# Patient Record
Sex: FEMALE | Race: WHITE | Employment: FULL TIME | URBAN - METROPOLITAN AREA
[De-identification: names, ages, dates, MRNs, and addresses within clinical notes are randomized per-mention and may not be internally consistent; named-entity substitution may affect disease eponyms.]

---

## 2017-09-02 ENCOUNTER — OFFICE VISIT (OUTPATIENT)
Dept: FAMILY MEDICINE CLINIC | Facility: CLINIC | Age: 57
End: 2017-09-02

## 2017-09-02 VITALS
WEIGHT: 239 LBS | SYSTOLIC BLOOD PRESSURE: 122 MMHG | HEART RATE: 78 BPM | TEMPERATURE: 98 F | DIASTOLIC BLOOD PRESSURE: 78 MMHG | RESPIRATION RATE: 18 BRPM

## 2017-09-02 DIAGNOSIS — L03.112 CELLULITIS OF LEFT AXILLA: Primary | ICD-10-CM

## 2017-09-02 PROCEDURE — 99203 OFFICE O/P NEW LOW 30 MIN: CPT | Performed by: NURSE PRACTITIONER

## 2017-09-02 RX ORDER — MUPIROCIN CALCIUM 20 MG/G
CREAM TOPICAL
Qty: 15 G | Refills: 3 | Status: SHIPPED | OUTPATIENT
Start: 2017-09-02 | End: 2018-12-14

## 2017-09-02 RX ORDER — SULFAMETHOXAZOLE AND TRIMETHOPRIM 800; 160 MG/1; MG/1
1 TABLET ORAL 2 TIMES DAILY
Qty: 14 TABLET | Refills: 0 | Status: SHIPPED | OUTPATIENT
Start: 2017-09-02 | End: 2017-09-09

## 2017-09-02 NOTE — PROGRESS NOTES
CHIEF COMPLAINT:   Patient presents with:  Rash: rash under arm first noticed on Monday 08.28.2017      HPI:     Naveen Major is a 49year old female who presents with concerns of rash on left arm pit and left upper arm.  Patient first noticed symptom SKIN: Lesion(s):#1 is located on left axilla, has 4 x 4 cm area of erythematous papular lesions.  ; #2 is located on left upper extremity below axilla is 6.5 x 14   + erythema, denies tenderness, + increased warmth, no fluctuance, no  drainage, + sharply de Risks, benefits, and side effects of medication explained and discussed. Patient Instructions     Cellulitis  Cellulitis is an infection of the deep layers of skin. A break in the skin, such as a cut or scratch, can let bacteria under the skin.  If the b · Fluid leaking from the skin (pus)  · Fever higher of 100.4º F (38.0º C) or higher after 2 days on antibiotics  Date Last Reviewed: 9/1/2016  © 5472-7045 [employer] [address] [place] [address], [address] [place] [address]. All rights reserved.  This infor

## 2017-09-02 NOTE — PATIENT INSTRUCTIONS
Cellulitis  Cellulitis is an infection of the deep layers of skin. A break in the skin, such as a cut or scratch, can let bacteria under the skin. If the bacteria get to deep layers of the skin, it can be serious.  If not treated, cellulitis can get into · Fever higher of 100.4º F (38.0º C) or higher after 2 days on antibiotics  Date Last Reviewed: 9/1/2016  © 8885-3377 [address], [address] [place] [address]. All rights reserved.  This information is not intended as a substitut

## 2017-09-26 ENCOUNTER — LAB SERVICES (OUTPATIENT)
Dept: OTHER | Age: 57
End: 2017-09-26

## 2017-09-26 ENCOUNTER — CHARTING TRANS (OUTPATIENT)
Dept: OTHER | Age: 57
End: 2017-09-26

## 2017-09-26 ASSESSMENT — PAIN SCALES - GENERAL: PAINLEVEL_OUTOF10: 0

## 2017-09-27 ENCOUNTER — CHARTING TRANS (OUTPATIENT)
Dept: OTHER | Age: 57
End: 2017-09-27

## 2017-09-27 LAB
25(OH)D3+25(OH)D2 SERPL-MCNC: 25.7 NG/ML (ref 30–100)
ALBUMIN SERPL-MCNC: 4 G/DL (ref 3.6–5.1)
ALBUMIN/GLOB SERPL: 1.2 (ref 1–2.4)
ALP SERPL-CCNC: 65 UNITS/L (ref 45–117)
ALT SERPL-CCNC: 30 UNITS/L
ANION GAP SERPL CALC-SCNC: 14 MMOL/L (ref 10–20)
AST SERPL-CCNC: 16 UNITS/L
BASOPHILS # BLD: 0 K/MCL (ref 0–0.3)
BASOPHILS NFR BLD: 0 %
BILIRUB SERPL-MCNC: 0.6 MG/DL (ref 0.2–1)
BUN SERPL-MCNC: 18 MG/DL (ref 6–20)
BUN/CREAT SERPL: 22 (ref 7–25)
CALCIUM SERPL-MCNC: 8.8 MG/DL (ref 8.4–10.2)
CHLORIDE SERPL-SCNC: 105 MMOL/L (ref 98–107)
CHOLEST SERPL-MCNC: 211 MG/DL
CHOLEST/HDLC SERPL: 4.5
CO2 SERPL-SCNC: 27 MMOL/L (ref 21–32)
CREAT SERPL-MCNC: 0.83 MG/DL (ref 0.51–0.95)
DIFFERENTIAL METHOD BLD: NORMAL
EOSINOPHIL # BLD: 0.2 K/MCL (ref 0.1–0.5)
EOSINOPHIL NFR BLD: 3 %
ERYTHROCYTE [DISTWIDTH] IN BLOOD: 12.9 % (ref 11–15)
GLOBULIN SER-MCNC: 3.2 G/DL (ref 2–4)
GLUCOSE SERPL-MCNC: 81 MG/DL (ref 65–99)
HBA1C MFR BLD: 5.3 % (ref 4.5–5.6)
HDLC SERPL-MCNC: 47 MG/DL
HEMATOCRIT: 39.5 % (ref 36–46.5)
HEMOGLOBIN: 12.7 G/DL (ref 12–15.5)
LDLC SERPL CALC-MCNC: 142 MG/DL
LENGTH OF FAST TIME PATIENT: ABNORMAL HRS
LENGTH OF FAST TIME PATIENT: ABNORMAL HRS
LYMPHOCYTES # BLD: 1.3 K/MCL (ref 1–4)
LYMPHOCYTES NFR BLD: 23 %
MEAN CORPUSCULAR HEMOGLOBIN: 31.5 PG (ref 26–34)
MEAN CORPUSCULAR HGB CONC: 32.2 G/DL (ref 32–36.5)
MEAN CORPUSCULAR VOLUME: 98 FL (ref 78–100)
MONOCYTES # BLD: 0.4 K/MCL (ref 0.3–0.9)
MONOCYTES NFR BLD: 7 %
NEUTROPHILS # BLD: 3.8 K/MCL (ref 1.8–7.7)
NEUTROPHILS NFR BLD: 67 %
NONHDLC SERPL-MCNC: 164 MG/DL
PLATELET COUNT: 193 K/MCL (ref 140–450)
POTASSIUM SERPL-SCNC: 4.1 MMOL/L (ref 3.4–5.1)
RED CELL COUNT: 4.03 MIL/MCL (ref 4–5.2)
SODIUM SERPL-SCNC: 142 MMOL/L (ref 135–145)
TOTAL PROTEIN: 7.2 G/DL (ref 6.4–8.2)
TRIGL SERPL-MCNC: 110 MG/DL
TSH SERPL-ACNC: 1.62 MCUNITS/ML (ref 0.35–5)
WHITE BLOOD COUNT: 5.6 K/MCL (ref 4.2–11)

## 2017-10-06 ENCOUNTER — CHARTING TRANS (OUTPATIENT)
Dept: OTHER | Age: 57
End: 2017-10-06

## 2017-10-06 ENCOUNTER — HOSPITAL (OUTPATIENT)
Dept: OTHER | Age: 57
End: 2017-10-06
Attending: FAMILY MEDICINE

## 2017-10-06 ENCOUNTER — IMAGING SERVICES (OUTPATIENT)
Dept: OTHER | Age: 57
End: 2017-10-06

## 2018-01-15 ENCOUNTER — CHARTING TRANS (OUTPATIENT)
Dept: OTHER | Age: 58
End: 2018-01-15

## 2018-01-15 ENCOUNTER — LAB SERVICES (OUTPATIENT)
Dept: OTHER | Age: 58
End: 2018-01-15

## 2018-01-22 ENCOUNTER — CHARTING TRANS (OUTPATIENT)
Dept: OTHER | Age: 58
End: 2018-01-22

## 2018-01-22 LAB — PAP WITH HIGH RISK HPV: NORMAL

## 2018-04-27 ENCOUNTER — CHARTING TRANS (OUTPATIENT)
Dept: OTHER | Age: 58
End: 2018-04-27

## 2018-05-01 ENCOUNTER — CHARTING TRANS (OUTPATIENT)
Dept: OTHER | Age: 58
End: 2018-05-01

## 2018-05-03 ENCOUNTER — HOSPITAL (OUTPATIENT)
Dept: OTHER | Age: 58
End: 2018-05-03
Attending: PHYSICIAN ASSISTANT

## 2018-05-03 ENCOUNTER — IMAGING SERVICES (OUTPATIENT)
Dept: OTHER | Age: 58
End: 2018-05-03

## 2018-05-04 ENCOUNTER — CHARTING TRANS (OUTPATIENT)
Dept: OTHER | Age: 58
End: 2018-05-04

## 2018-11-01 VITALS
OXYGEN SATURATION: 97 % | BODY MASS INDEX: 43.82 KG/M2 | SYSTOLIC BLOOD PRESSURE: 110 MMHG | HEIGHT: 62 IN | HEART RATE: 83 BPM | WEIGHT: 238.1 LBS | TEMPERATURE: 98.4 F | RESPIRATION RATE: 16 BRPM | DIASTOLIC BLOOD PRESSURE: 84 MMHG

## 2018-11-01 VITALS
WEIGHT: 235.89 LBS | HEART RATE: 81 BPM | BODY MASS INDEX: 43.41 KG/M2 | OXYGEN SATURATION: 96 % | HEIGHT: 62 IN | TEMPERATURE: 99.2 F

## 2018-11-02 VITALS
DIASTOLIC BLOOD PRESSURE: 78 MMHG | BODY MASS INDEX: 44.95 KG/M2 | WEIGHT: 244.25 LBS | HEIGHT: 62 IN | HEART RATE: 80 BPM | SYSTOLIC BLOOD PRESSURE: 122 MMHG

## 2018-11-02 VITALS
DIASTOLIC BLOOD PRESSURE: 80 MMHG | SYSTOLIC BLOOD PRESSURE: 126 MMHG | TEMPERATURE: 99 F | BODY MASS INDEX: 42.06 KG/M2 | WEIGHT: 237.38 LBS | HEIGHT: 63 IN | RESPIRATION RATE: 16 BRPM | OXYGEN SATURATION: 95 % | HEART RATE: 78 BPM

## 2019-03-02 ENCOUNTER — TELEPHONE (OUTPATIENT)
Dept: SCHEDULING | Age: 59
End: 2019-03-02

## 2019-03-06 ENCOUNTER — TELEPHONE (OUTPATIENT)
Dept: SCHEDULING | Age: 59
End: 2019-03-06

## 2020-01-01 ENCOUNTER — EXTERNAL RECORD (OUTPATIENT)
Dept: HEALTH INFORMATION MANAGEMENT | Facility: OTHER | Age: 60
End: 2020-01-01

## 2020-06-29 ENCOUNTER — TELEPHONE (OUTPATIENT)
Dept: OBGYN | Age: 60
End: 2020-06-29

## 2020-07-02 ENCOUNTER — OFFICE VISIT (OUTPATIENT)
Dept: OBGYN | Age: 60
End: 2020-07-02

## 2020-07-02 VITALS
HEIGHT: 62 IN | TEMPERATURE: 97.9 F | SYSTOLIC BLOOD PRESSURE: 126 MMHG | DIASTOLIC BLOOD PRESSURE: 82 MMHG | BODY MASS INDEX: 49.13 KG/M2 | WEIGHT: 267 LBS

## 2020-07-02 DIAGNOSIS — N95.0 POSTMENOPAUSAL BLEEDING: Primary | ICD-10-CM

## 2020-07-02 PROCEDURE — 76830 TRANSVAGINAL US NON-OB: CPT | Performed by: LEGAL MEDICINE

## 2020-07-02 PROCEDURE — 58100 BIOPSY OF UTERUS LINING: CPT | Performed by: LEGAL MEDICINE

## 2020-07-02 PROCEDURE — 99212 OFFICE O/P EST SF 10 MIN: CPT | Performed by: LEGAL MEDICINE

## 2020-07-02 RX ORDER — OMEPRAZOLE 20 MG/1
TABLET, DELAYED RELEASE ORAL
COMMUNITY

## 2020-07-02 RX ORDER — ERGOCALCIFEROL 1.25 MG/1
50000 CAPSULE ORAL
COMMUNITY
Start: 2018-03-17

## 2020-07-07 LAB — PATHOLOGIST NAME: NORMAL

## 2020-08-05 DIAGNOSIS — Z12.31 ENCOUNTER FOR SCREENING MAMMOGRAM FOR MALIGNANT NEOPLASM OF BREAST: Primary | ICD-10-CM

## 2020-08-06 ENCOUNTER — OFFICE VISIT (OUTPATIENT)
Dept: FAMILY MEDICINE | Age: 60
End: 2020-08-06

## 2020-08-06 VITALS
RESPIRATION RATE: 16 BRPM | HEART RATE: 80 BPM | DIASTOLIC BLOOD PRESSURE: 80 MMHG | HEIGHT: 62 IN | TEMPERATURE: 98.3 F | OXYGEN SATURATION: 95 % | WEIGHT: 259 LBS | SYSTOLIC BLOOD PRESSURE: 126 MMHG | BODY MASS INDEX: 47.66 KG/M2

## 2020-08-06 DIAGNOSIS — E04.1 THYROID NODULE: ICD-10-CM

## 2020-08-06 DIAGNOSIS — Z00.00 ANNUAL PHYSICAL EXAM: Primary | ICD-10-CM

## 2020-08-06 DIAGNOSIS — Z11.59 NEED FOR HEPATITIS C SCREENING TEST: ICD-10-CM

## 2020-08-06 DIAGNOSIS — E66.01 MORBID OBESITY WITH BMI OF 45.0-49.9, ADULT (CMD): ICD-10-CM

## 2020-08-06 DIAGNOSIS — Z23 NEED FOR VACCINATION: ICD-10-CM

## 2020-08-06 PROCEDURE — 99396 PREV VISIT EST AGE 40-64: CPT | Performed by: FAMILY MEDICINE

## 2020-08-06 PROCEDURE — 90471 IMMUNIZATION ADMIN: CPT

## 2020-08-06 PROCEDURE — 90715 TDAP VACCINE 7 YRS/> IM: CPT

## 2020-08-06 SDOH — HEALTH STABILITY: MENTAL HEALTH: HOW OFTEN DO YOU HAVE A DRINK CONTAINING ALCOHOL?: 2-3 TIMES A WEEK

## 2020-08-06 ASSESSMENT — PATIENT HEALTH QUESTIONNAIRE - PHQ9
SUM OF ALL RESPONSES TO PHQ9 QUESTIONS 1 AND 2: 0
CLINICAL INTERPRETATION OF PHQ2 SCORE: NO FURTHER SCREENING NEEDED
2. FEELING DOWN, DEPRESSED OR HOPELESS: NOT AT ALL
CLINICAL INTERPRETATION OF PHQ9 SCORE: NO FURTHER SCREENING NEEDED
SUM OF ALL RESPONSES TO PHQ9 QUESTIONS 1 AND 2: 0
1. LITTLE INTEREST OR PLEASURE IN DOING THINGS: NOT AT ALL

## 2020-08-06 ASSESSMENT — ENCOUNTER SYMPTOMS
ALLERGIC/IMMUNOLOGIC NEGATIVE: 1
EYES NEGATIVE: 1
ENDOCRINE NEGATIVE: 1
GASTROINTESTINAL NEGATIVE: 1
PSYCHIATRIC NEGATIVE: 1
HEMATOLOGIC/LYMPHATIC NEGATIVE: 1
UNEXPECTED WEIGHT CHANGE: 1
NEUROLOGICAL NEGATIVE: 1
RESPIRATORY NEGATIVE: 1

## 2020-08-12 ENCOUNTER — LAB SERVICES (OUTPATIENT)
Dept: FAMILY MEDICINE | Age: 60
End: 2020-08-12

## 2020-08-12 DIAGNOSIS — Z11.59 NEED FOR HEPATITIS C SCREENING TEST: ICD-10-CM

## 2020-08-12 DIAGNOSIS — E04.1 THYROID NODULE: ICD-10-CM

## 2020-08-12 DIAGNOSIS — Z00.00 ANNUAL PHYSICAL EXAM: ICD-10-CM

## 2020-08-12 PROCEDURE — 36415 COLL VENOUS BLD VENIPUNCTURE: CPT

## 2020-08-12 PROCEDURE — 80061 LIPID PANEL: CPT | Performed by: FAMILY MEDICINE

## 2020-08-12 PROCEDURE — 86803 HEPATITIS C AB TEST: CPT | Performed by: FAMILY MEDICINE

## 2020-08-12 PROCEDURE — 80050 GENERAL HEALTH PANEL: CPT | Performed by: FAMILY MEDICINE

## 2020-08-12 PROCEDURE — 82306 VITAMIN D 25 HYDROXY: CPT | Performed by: FAMILY MEDICINE

## 2020-08-13 LAB
25(OH)D3+25(OH)D2 SERPL-MCNC: 28.5 NG/ML (ref 30–100)
ALBUMIN SERPL-MCNC: 3.8 G/DL (ref 3.6–5.1)
ALBUMIN/GLOB SERPL: 1.1 {RATIO} (ref 1–2.4)
ALP SERPL-CCNC: 61 UNITS/L (ref 45–117)
ALT SERPL-CCNC: 40 UNITS/L
ANION GAP SERPL CALC-SCNC: 10 MMOL/L (ref 10–20)
AST SERPL-CCNC: 31 UNITS/L
BASOPHILS # BLD: 0 K/MCL (ref 0–0.3)
BASOPHILS NFR BLD: 1 %
BILIRUB SERPL-MCNC: 0.8 MG/DL (ref 0.2–1)
BUN SERPL-MCNC: 17 MG/DL (ref 6–20)
BUN/CREAT SERPL: 26 (ref 7–25)
CALCIUM SERPL-MCNC: 8.7 MG/DL (ref 8.4–10.2)
CHLORIDE SERPL-SCNC: 111 MMOL/L (ref 98–107)
CHOLEST SERPL-MCNC: 183 MG/DL
CHOLEST/HDLC SERPL: 6.3 {RATIO}
CO2 SERPL-SCNC: 26 MMOL/L (ref 21–32)
CREAT SERPL-MCNC: 0.65 MG/DL (ref 0.51–0.95)
DIFFERENTIAL METHOD BLD: ABNORMAL
EOSINOPHIL # BLD: 0.2 K/MCL (ref 0.1–0.5)
EOSINOPHIL NFR BLD: 5 %
ERYTHROCYTE [DISTWIDTH] IN BLOOD: 12.4 % (ref 11–15)
GLOBULIN SER-MCNC: 3.5 G/DL (ref 2–4)
GLUCOSE SERPL-MCNC: 103 MG/DL (ref 65–99)
HCT VFR BLD CALC: 39.3 % (ref 36–46.5)
HCV AB SER QL: NEGATIVE
HDLC SERPL-MCNC: 29 MG/DL
HGB BLD-MCNC: 12.6 G/DL (ref 12–15.5)
LDLC SERPL CALC-MCNC: 128 MG/DL
LENGTH OF FAST TIME PATIENT: ABNORMAL HRS
LENGTH OF FAST TIME PATIENT: ABNORMAL HRS
LYMPHOCYTES # BLD: 1.1 K/MCL (ref 1–4)
LYMPHOCYTES NFR BLD: 32 %
MCH RBC QN AUTO: 31 PG (ref 26–34)
MCHC RBC AUTO-ENTMCNC: 32.1 G/DL (ref 32–36.5)
MCV RBC AUTO: 96.8 FL (ref 78–100)
MONOCYTES # BLD: 0.2 K/MCL (ref 0.3–0.9)
MONOCYTES NFR BLD: 8 %
NEUTROPHILS # BLD: 1.4 K/MCL (ref 1.8–7.7)
NEUTS BAND NFR BLD: 6 % (ref 0–10)
NEUTS SEG NFR BLD: 42 %
NONHDLC SERPL-MCNC: 154 MG/DL
NRBC BLD MANUAL-RTO: 0 /100 WBC
PLAT MORPH BLD: NORMAL
PLATELET # BLD: 245 K/MCL (ref 140–450)
POTASSIUM SERPL-SCNC: 3.8 MMOL/L (ref 3.4–5.1)
PROT SERPL-MCNC: 7.3 G/DL (ref 6.4–8.2)
RBC # BLD: 4.06 MIL/MCL (ref 4–5.2)
RBC MORPH BLD: NORMAL
SODIUM SERPL-SCNC: 143 MMOL/L (ref 135–145)
TRIGL SERPL-MCNC: 128 MG/DL
TSH SERPL-ACNC: 1.65 MCUNITS/ML (ref 0.35–5)
VARIANT LYMPHS NFR BLD: 6 % (ref 0–5)
WBC # BLD: 3 K/MCL (ref 4.2–11)
WBC MORPH BLD: NORMAL

## 2020-08-19 ENCOUNTER — HOSPITAL ENCOUNTER (OUTPATIENT)
Dept: MAMMOGRAPHY | Age: 60
Discharge: HOME | End: 2020-08-19
Attending: FAMILY MEDICINE

## 2020-08-19 ENCOUNTER — TELEPHONE (OUTPATIENT)
Dept: FAMILY MEDICINE | Age: 60
End: 2020-08-19

## 2020-08-19 DIAGNOSIS — Z12.31 ENCOUNTER FOR SCREENING MAMMOGRAM FOR MALIGNANT NEOPLASM OF BREAST: ICD-10-CM

## 2020-08-19 PROCEDURE — 77063 BREAST TOMOSYNTHESIS BI: CPT

## 2020-09-02 ENCOUNTER — TELEPHONE (OUTPATIENT)
Dept: SCHEDULING | Age: 60
End: 2020-09-02

## 2021-01-01 ENCOUNTER — EXTERNAL RECORD (OUTPATIENT)
Dept: HEALTH INFORMATION MANAGEMENT | Facility: OTHER | Age: 61
End: 2021-01-01

## 2021-01-01 ENCOUNTER — EXTERNAL RECORD (OUTPATIENT)
Dept: OTHER | Age: 61
End: 2021-01-01

## 2021-10-05 ENCOUNTER — TELEPHONE (OUTPATIENT)
Dept: FAMILY MEDICINE | Age: 61
End: 2021-10-05

## 2021-10-20 ENCOUNTER — CLINICAL ABSTRACT (OUTPATIENT)
Dept: HEALTH INFORMATION MANAGEMENT | Age: 61
End: 2021-10-20

## 2022-01-10 DIAGNOSIS — Z11.59 SCREENING EXAMINATION FOR OTHER ARTHROPOD-BORNE VIRAL DISEASES: Primary | ICD-10-CM

## 2022-01-17 ENCOUNTER — LAB SERVICES (OUTPATIENT)
Dept: LAB | Age: 62
End: 2022-01-17

## 2022-01-17 PROCEDURE — U0005 INFEC AGEN DETEC AMPLI PROBE: HCPCS | Performed by: PSYCHIATRY & NEUROLOGY

## 2022-01-17 PROCEDURE — U0003 INFECTIOUS AGENT DETECTION BY NUCLEIC ACID (DNA OR RNA); SEVERE ACUTE RESPIRATORY SYNDROME CORONAVIRUS 2 (SARS-COV-2) (CORONAVIRUS DISEASE [COVID-19]), AMPLIFIED PROBE TECHNIQUE, MAKING USE OF HIGH THROUGHPUT TECHNOLOGIES AS DESCRIBED BY CMS-2020-01-R: HCPCS | Performed by: PSYCHIATRY & NEUROLOGY

## 2022-01-18 LAB
SARS-COV-2 RNA RESP QL NAA+PROBE: NOT DETECTED
SERVICE CMNT-IMP: NORMAL
SERVICE CMNT-IMP: NORMAL